# Patient Record
Sex: MALE | Race: WHITE | NOT HISPANIC OR LATINO | Employment: FULL TIME | URBAN - METROPOLITAN AREA
[De-identification: names, ages, dates, MRNs, and addresses within clinical notes are randomized per-mention and may not be internally consistent; named-entity substitution may affect disease eponyms.]

---

## 2024-03-28 ENCOUNTER — HOSPITAL ENCOUNTER (EMERGENCY)
Facility: HOSPITAL | Age: 29
Discharge: HOME | End: 2024-03-28
Attending: EMERGENCY MEDICINE
Payer: COMMERCIAL

## 2024-03-28 ENCOUNTER — APPOINTMENT (OUTPATIENT)
Dept: CT IMAGING | Facility: HOSPITAL | Age: 29
End: 2024-03-28
Payer: COMMERCIAL

## 2024-03-28 VITALS
WEIGHT: 315 LBS | HEART RATE: 99 BPM | DIASTOLIC BLOOD PRESSURE: 64 MMHG | SYSTOLIC BLOOD PRESSURE: 118 MMHG | TEMPERATURE: 98.1 F | BODY MASS INDEX: 41.75 KG/M2 | HEIGHT: 73 IN | RESPIRATION RATE: 17 BRPM | OXYGEN SATURATION: 96 %

## 2024-03-28 DIAGNOSIS — B34.9 VIRAL ILLNESS: ICD-10-CM

## 2024-03-28 DIAGNOSIS — R79.89 ELEVATED LFTS: ICD-10-CM

## 2024-03-28 DIAGNOSIS — K76.0 HEPATIC STEATOSIS: Primary | ICD-10-CM

## 2024-03-28 DIAGNOSIS — E87.6 HYPOKALEMIA: ICD-10-CM

## 2024-03-28 DIAGNOSIS — D72.829 LEUKOCYTOSIS, UNSPECIFIED TYPE: ICD-10-CM

## 2024-03-28 DIAGNOSIS — R30.0 DYSURIA: ICD-10-CM

## 2024-03-28 LAB
ALBUMIN SERPL-MCNC: 3.7 G/DL (ref 3.5–5.2)
ALBUMIN/GLOB SERPL: 1.2 G/DL
ALP SERPL-CCNC: 52 U/L (ref 39–117)
ALT SERPL W P-5'-P-CCNC: 64 U/L (ref 1–41)
AMORPH URATE CRY URNS QL MICRO: NORMAL /HPF
ANION GAP SERPL CALCULATED.3IONS-SCNC: 10.5 MMOL/L (ref 5–15)
ANISOCYTOSIS BLD QL: ABNORMAL
AST SERPL-CCNC: 87 U/L (ref 1–40)
BACTERIA UR QL AUTO: NORMAL /HPF
BASOPHILS # BLD MANUAL: 0.13 10*3/MM3 (ref 0–0.2)
BASOPHILS NFR BLD MANUAL: 1 % (ref 0–1.5)
BILIRUB SERPL-MCNC: 0.9 MG/DL (ref 0–1.2)
BILIRUB UR QL STRIP: ABNORMAL
BUN SERPL-MCNC: 8 MG/DL (ref 6–20)
BUN/CREAT SERPL: 7 (ref 7–25)
CALCIUM SPEC-SCNC: 8.7 MG/DL (ref 8.6–10.5)
CHLORIDE SERPL-SCNC: 96 MMOL/L (ref 98–107)
CLARITY UR: CLEAR
CO2 SERPL-SCNC: 26.5 MMOL/L (ref 22–29)
COLOR UR: ABNORMAL
CREAT SERPL-MCNC: 1.15 MG/DL (ref 0.76–1.27)
D-LACTATE SERPL-SCNC: 1.6 MMOL/L (ref 0.5–2)
DEPRECATED RDW RBC AUTO: 41.8 FL (ref 37–54)
EGFRCR SERPLBLD CKD-EPI 2021: 89.5 ML/MIN/1.73
ERYTHROCYTE [DISTWIDTH] IN BLOOD BY AUTOMATED COUNT: 14 % (ref 12.3–15.4)
GLOBULIN UR ELPH-MCNC: 3.2 GM/DL
GLUCOSE SERPL-MCNC: 110 MG/DL (ref 65–99)
GLUCOSE UR STRIP-MCNC: NEGATIVE MG/DL
HCT VFR BLD AUTO: 41.7 % (ref 37.5–51)
HETEROPH AB SER QL LA: NEGATIVE
HGB BLD-MCNC: 13.9 G/DL (ref 13–17.7)
HGB UR QL STRIP.AUTO: ABNORMAL
HOLD SPECIMEN: NORMAL
HYALINE CASTS UR QL AUTO: NORMAL /LPF
KETONES UR QL STRIP: NEGATIVE
LEUKOCYTE ESTERASE UR QL STRIP.AUTO: NEGATIVE
LIPASE SERPL-CCNC: 39 U/L (ref 13–60)
LYMPHOCYTES # BLD MANUAL: 6.45 10*3/MM3 (ref 0.7–3.1)
LYMPHOCYTES NFR BLD MANUAL: 13 % (ref 5–12)
MAGNESIUM SERPL-MCNC: 2 MG/DL (ref 1.6–2.6)
MCH RBC QN AUTO: 27.1 PG (ref 26.6–33)
MCHC RBC AUTO-ENTMCNC: 33.3 G/DL (ref 31.5–35.7)
MCV RBC AUTO: 81.4 FL (ref 79–97)
METAMYELOCYTES NFR BLD MANUAL: 1 % (ref 0–0)
MICROCYTES BLD QL: ABNORMAL
MONOCYTES # BLD: 1.68 10*3/MM3 (ref 0.1–0.9)
NEUTROPHILS # BLD AUTO: 4.51 10*3/MM3 (ref 1.7–7)
NEUTROPHILS NFR BLD MANUAL: 35 % (ref 42.7–76)
NITRITE UR QL STRIP: NEGATIVE
NRBC SPEC MANUAL: 1 /100 WBC (ref 0–0.2)
PH UR STRIP.AUTO: 6 [PH] (ref 5–8)
PLAT MORPH BLD: NORMAL
PLATELET # BLD AUTO: 163 10*3/MM3 (ref 140–450)
PMV BLD AUTO: 9.7 FL (ref 6–12)
POLYCHROMASIA BLD QL SMEAR: ABNORMAL
POTASSIUM SERPL-SCNC: 2.9 MMOL/L (ref 3.5–5.2)
PROT SERPL-MCNC: 6.9 G/DL (ref 6–8.5)
PROT UR QL STRIP: ABNORMAL
RBC # BLD AUTO: 5.12 10*6/MM3 (ref 4.14–5.8)
RBC # UR STRIP: NORMAL /HPF
REF LAB TEST METHOD: NORMAL
SMUDGE CELLS BLD QL SMEAR: ABNORMAL
SODIUM SERPL-SCNC: 133 MMOL/L (ref 136–145)
SP GR UR STRIP: 1.01 (ref 1–1.03)
SQUAMOUS #/AREA URNS HPF: NORMAL /HPF
UROBILINOGEN UR QL STRIP: ABNORMAL
VARIANT LYMPHS NFR BLD MANUAL: 50 % (ref 19.6–45.3)
WBC # UR STRIP: NORMAL /HPF
WBC NRBC COR # BLD AUTO: 12.82 10*3/MM3 (ref 3.4–10.8)

## 2024-03-28 PROCEDURE — 83735 ASSAY OF MAGNESIUM: CPT | Performed by: PHYSICIAN ASSISTANT

## 2024-03-28 PROCEDURE — 99284 EMERGENCY DEPT VISIT MOD MDM: CPT | Performed by: PHYSICIAN ASSISTANT

## 2024-03-28 PROCEDURE — 83690 ASSAY OF LIPASE: CPT | Performed by: PHYSICIAN ASSISTANT

## 2024-03-28 PROCEDURE — 86308 HETEROPHILE ANTIBODY SCREEN: CPT | Performed by: PHYSICIAN ASSISTANT

## 2024-03-28 PROCEDURE — 85007 BL SMEAR W/DIFF WBC COUNT: CPT | Performed by: PHYSICIAN ASSISTANT

## 2024-03-28 PROCEDURE — 85025 COMPLETE CBC W/AUTO DIFF WBC: CPT | Performed by: PHYSICIAN ASSISTANT

## 2024-03-28 PROCEDURE — 36415 COLL VENOUS BLD VENIPUNCTURE: CPT

## 2024-03-28 PROCEDURE — 80053 COMPREHEN METABOLIC PANEL: CPT | Performed by: PHYSICIAN ASSISTANT

## 2024-03-28 PROCEDURE — 99285 EMERGENCY DEPT VISIT HI MDM: CPT

## 2024-03-28 PROCEDURE — 25510000001 IOPAMIDOL PER 1 ML: Performed by: EMERGENCY MEDICINE

## 2024-03-28 PROCEDURE — 83605 ASSAY OF LACTIC ACID: CPT | Performed by: PHYSICIAN ASSISTANT

## 2024-03-28 PROCEDURE — 25810000003 SODIUM CHLORIDE 0.9 % SOLUTION: Performed by: PHYSICIAN ASSISTANT

## 2024-03-28 PROCEDURE — 81001 URINALYSIS AUTO W/SCOPE: CPT | Performed by: PHYSICIAN ASSISTANT

## 2024-03-28 PROCEDURE — 74177 CT ABD & PELVIS W/CONTRAST: CPT

## 2024-03-28 RX ORDER — ONDANSETRON 4 MG/1
4 TABLET, ORALLY DISINTEGRATING ORAL EVERY 6 HOURS PRN
Qty: 15 TABLET | Refills: 0 | Status: SHIPPED | OUTPATIENT
Start: 2024-03-28 | End: 2024-04-02

## 2024-03-28 RX ORDER — POTASSIUM CHLORIDE 20 MEQ/1
20 TABLET, EXTENDED RELEASE ORAL DAILY
Qty: 7 TABLET | Refills: 0 | Status: SHIPPED | OUTPATIENT
Start: 2024-03-28 | End: 2024-04-04

## 2024-03-28 RX ORDER — IBUPROFEN 200 MG
200 TABLET ORAL EVERY 6 HOURS PRN
COMMUNITY

## 2024-03-28 RX ORDER — POTASSIUM CHLORIDE 1.5 G/1.58G
40 POWDER, FOR SOLUTION ORAL ONCE
Status: COMPLETED | OUTPATIENT
Start: 2024-03-28 | End: 2024-03-28

## 2024-03-28 RX ORDER — CEPHALEXIN 500 MG/1
500 CAPSULE ORAL 3 TIMES DAILY
Qty: 21 CAPSULE | Refills: 0 | Status: SHIPPED | OUTPATIENT
Start: 2024-03-28 | End: 2024-04-04

## 2024-03-28 RX ORDER — DICYCLOMINE HCL 20 MG
20 TABLET ORAL EVERY 6 HOURS PRN
Qty: 15 TABLET | Refills: 0 | Status: SHIPPED | OUTPATIENT
Start: 2024-03-28 | End: 2024-04-02

## 2024-03-28 RX ADMIN — POTASSIUM CHLORIDE 40 MEQ: 1.5 POWDER, FOR SOLUTION ORAL at 12:50

## 2024-03-28 RX ADMIN — SODIUM CHLORIDE 1000 ML: 9 INJECTION, SOLUTION INTRAVENOUS at 11:40

## 2024-03-28 RX ADMIN — IOPAMIDOL 100 ML: 755 INJECTION, SOLUTION INTRAVENOUS at 12:09

## 2024-03-28 NOTE — DISCHARGE INSTRUCTIONS
As discussed, light activity for the next 2 to 3 days and drink at least 8 glasses of water a day to stay hydrated.  Take the medications as prescribed.  I advise that you call both numbers provided to establish a primary care doctor and to have a close follow-up with gastroenterology to recheck your symptoms and recheck your blood work.

## 2024-03-28 NOTE — Clinical Note
[facility]  [address]  [place] [address]    Yuval Izaguirre was seen and treated in our emergency department on 3/28/2024.  He may return to work on 04/01/2024.         Thank you for choosing [facility].    Arabella Mendez PA-C

## 2024-03-28 NOTE — Clinical Note
[facility]  [address]  [place] [address]    Yuval Izaguirre was seen and treated in our emergency department on 3/28/2024.  He may return to work on 04/01/2024.         Thank you for choosing [facility].    Arabella Mendez PA-C

## 2024-03-28 NOTE — FSED PROVIDER NOTE
Subjective   History of Present Illness    Patient is complaining of nausea, vomiting, chills, fever which started about 7 days ago.  She was seen at [facility] 3 days ago and was diagnosed with bilateral ear infection and was was prescribed a Z-Qamar and steroids, he developed waxing and waning right lower quadrant abdominal pain 2 days ago with associated dysuria.  Denies any abdominal surgeries in the past.  He reports that he has vomited approximately 3 times today.    In addition, he reports that he had an episode of diarrhea prior to arrival and it was watery with bright red blood.    Review of Systems   Constitutional:  Positive for chills and fever. Negative for activity change and appetite change.   Eyes:  Negative for pain.   Respiratory:  Negative for shortness of breath.    Gastrointestinal:  Positive for abdominal pain, diarrhea, nausea and vomiting.   Musculoskeletal:  Negative for arthralgias.   Skin:  Negative for color change.   Neurological:  Negative for dizziness.   All other systems reviewed and are negative.      History reviewed. No pertinent past medical history.    No Known Allergies    Past Surgical History:   Procedure Laterality Date    LEG TENDON REPAIR Left     SHOULDER ARTHROSCOPY Right        History reviewed. No pertinent family history.    Social History     Socioeconomic History    Marital status: Single   Tobacco Use    Smokeless tobacco: Current     Types: Chew   Substance and Sexual Activity    Alcohol use: Yes     Comment: socially           Objective   Physical Exam  Vitals and nursing note reviewed.   Constitutional:       Appearance: Normal appearance. He is normal weight.   HENT:      Head: Normocephalic and atraumatic.      Nose: Nose normal.      Mouth/Throat:      Mouth: Mucous membranes are dry.   Eyes:      Pupils: Pupils are equal, round, and reactive to light.   Cardiovascular:      Rate and Rhythm: Regular rhythm. Tachycardia present.      Pulses:  Normal pulses.      Heart sounds: Normal heart sounds.   Pulmonary:      Effort: Pulmonary effort is normal.      Breath sounds: Normal breath sounds.   Abdominal:      Palpations: Abdomen is soft.   Musculoskeletal:         General: Normal range of motion.      Cervical back: Normal range of motion.      Right lower leg: No edema.      Left lower leg: No edema.   Skin:     General: Skin is warm.   Neurological:      General: No focal deficit present.      Mental Status: He is alert.   Psychiatric:         Behavior: Behavior is cooperative.         Procedures           ED Course  ED Course as of 03/28/24 1350   Thu Mar 28, 2024   1132 Blood, UA(!): Trace [SS]   1134 I offered him pain medication and nausea medication, he is refusing at this time. [SS]   1137 WBC(!): 12.82 [SS]   1148 Potassium(!): 2.9 [SS]   1149 ALT (SGPT)(!): 64 [SS]   1149 AST (SGOT)(!): 87 [SS]   1249 I rechecked patient and he reports feeling somewhat better after the IV fluids, I again offered him pain medication or nausea medication here, he is refusing.  I discussed with him the CT abdomen results including the hepatics steatosis and abnormal lab results including the slightly elevated LFTs and low potassium.  I will refer him to GI.  I also discussed with him the importance of establishing a primary care doctor. [SS]      ED Course User Index  [SS] Arabella Mendez PA-C                                           Medical Decision Making  Problems Addressed:  Dysuria: complicated acute illness or injury  Elevated LFTs: complicated acute illness or injury  Hepatic steatosis: complicated acute illness or injury  Hypokalemia: complicated acute illness or injury  Leukocytosis, unspecified type: complicated acute illness or injury  Viral illness: complicated acute illness or injury    Amount and/or Complexity of Data Reviewed  Labs: ordered. Decision-making details documented in ED Course.  Radiology: ordered.    Risk  Prescription drug  management.        Final diagnoses:   Elevated LFTs   Hypokalemia   Leukocytosis, unspecified type   Hepatic steatosis   Dysuria   Viral illness       ED Disposition  ED Disposition       ED Disposition   Discharge    Condition   Stable    Comment   --               PATIENT CONNECTION - [place] [address]  [phone]  Call today      Julissa Montes MD  [address]  [place] [address]  [phone]    Call today  GASTROENTEROLOGY         Medication List        New Prescriptions      cephalexin 500 MG capsule  Commonly known as: KEFLEX  Take 1 capsule by mouth 3 (Three) Times a Day for 7 days.     dicyclomine 20 MG tablet  Commonly known as: BENTYL  Take 1 tablet by mouth Every 6 (Six) Hours As Needed for Abdominal Cramping for up to 5 days.     ondansetron ODT 4 MG disintegrating tablet  Commonly known as: ZOFRAN-ODT  Place 1 tablet on the tongue Every 6 (Six) Hours As Needed for Nausea for up to 5 days.     potassium chloride 20 MEQ CR tablet  Commonly known as: KLOR-CON M20  Take 1 tablet by mouth Daily for 7 days.               Where to Get Your Medications        These medications were sent to [facility] [id number] - [place], [place] - [address] AT [facility] - [phone]  - [phone] fx [address], [facility] [address]      Phone: [phone]   cephalexin 500 MG capsule  dicyclomine 20 MG tablet  ondansetron ODT 4 MG disintegrating tablet  potassium chloride 20 MEQ CR tablet

## 2024-10-24 ENCOUNTER — OFFICE VISIT (OUTPATIENT)
Dept: FAMILY MEDICINE CLINIC | Facility: CLINIC | Age: 29
End: 2024-10-24
Payer: COMMERCIAL

## 2024-10-24 VITALS
SYSTOLIC BLOOD PRESSURE: 126 MMHG | WEIGHT: 315 LBS | DIASTOLIC BLOOD PRESSURE: 84 MMHG | BODY MASS INDEX: 41.75 KG/M2 | HEIGHT: 73 IN | OXYGEN SATURATION: 97 % | HEART RATE: 80 BPM

## 2024-10-24 DIAGNOSIS — Z13.21 ENCOUNTER FOR VITAMIN DEFICIENCY SCREENING: ICD-10-CM

## 2024-10-24 DIAGNOSIS — Z87.828 HISTORY OF TEAR OF MENISCUS OF KNEE JOINT: ICD-10-CM

## 2024-10-24 DIAGNOSIS — R73.03 PREDIABETES: ICD-10-CM

## 2024-10-24 DIAGNOSIS — E66.01 MORBID (SEVERE) OBESITY DUE TO EXCESS CALORIES: ICD-10-CM

## 2024-10-24 DIAGNOSIS — K76.0 HEPATIC STEATOSIS: ICD-10-CM

## 2024-10-24 DIAGNOSIS — Z23 ENCOUNTER FOR ADMINISTRATION OF VACCINE: ICD-10-CM

## 2024-10-24 DIAGNOSIS — Z11.59 ENCOUNTER FOR HEPATITIS C SCREENING TEST FOR LOW RISK PATIENT: ICD-10-CM

## 2024-10-24 DIAGNOSIS — Z13.29 SCREENING FOR THYROID DISORDER: ICD-10-CM

## 2024-10-24 DIAGNOSIS — Z00.00 ANNUAL PHYSICAL EXAM: Primary | ICD-10-CM

## 2024-10-24 PROCEDURE — 99385 PREV VISIT NEW AGE 18-39: CPT | Performed by: NURSE PRACTITIONER

## 2024-10-24 PROCEDURE — 99214 OFFICE O/P EST MOD 30 MIN: CPT | Performed by: NURSE PRACTITIONER

## 2024-10-24 PROCEDURE — 90471 IMMUNIZATION ADMIN: CPT | Performed by: NURSE PRACTITIONER

## 2024-10-24 PROCEDURE — 90715 TDAP VACCINE 7 YRS/> IM: CPT | Performed by: NURSE PRACTITIONER

## 2024-10-24 RX ORDER — SEMAGLUTIDE 0.25 MG/.5ML
0.25 INJECTION, SOLUTION SUBCUTANEOUS WEEKLY
Qty: 2 ML | Refills: 2 | Status: SHIPPED | OUTPATIENT
Start: 2024-10-24

## 2024-10-24 NOTE — PROGRESS NOTES
Office Note     Name: Yuval Izaguirre    : 1995     MRN: [id number]     Chief Complaint  Establish Care (Pt is here to establish care, Hasn't seen a primary care in about 6 years ), Weight Loss (Pt states he is wanting help with weight loss ), and Knee Pain (Pt has right knee pain for 2 years now, Pt states it hurts when moving )    Subjective     History of Present Illness:  Yuval Izaguirre is a 29 y.o. male who presents today for establishing care and annual physical.     History of Present Illness  The patient is a 29-year-old male who presents for an annual physical.    He has been experiencing increasing knee pain and is seeking a primary care physician for a physical examination. His last medical consultation was in 2024 at [facility] in [place], where he was diagnosed with fatty liver disease. He has not had any gastrointestinal issues since then. He was referred to a gastroenterologist, but the appointment was cancelled due to the doctor's unavailability; patient chose not to reschedule the appointment due to 4-month wait-period for scheduling. He has not had any lab tests done since his ER visit. His blood sugar levels have always been within the normal range. About 10 years ago, he was prescribed a weight loss medication, potentially Phentermine, which caused constipation. This medication provided him with energy.    Despite maintaining a healthy diet and regular exercise, he has been unable to lose weight. He typically gets 6 to 8 hours of sleep and eats small meals.    He has been undergoing physical therapy for a partially torn meniscus in his right knee. Despite this, he continues to experience pain. He has also had nosebleeds, which he attributes to dry sinuses. He has not had any nose fractures and has previously consulted an ENT specialist. He has a history of childhood asthma and contracted COVID-19 in 2019. He has received two doses of the Moderna COVID-19 vaccine.    SOCIAL  "HISTORY  He has 3 children. He works at [employer]. He is a nonsmoker.    FAMILY HISTORY  His maternal grandfather had stroke and heart attack and passed away. His grandmother  from brain cancer. His grandfather  3 years ago from lung cancer and he also had prostate cancer. There is some diabetes throughout the family. His father has gout. His mother has mastocytosis. His mother's 2 sisters also have a form of it.      Objective     History reviewed. No pertinent past medical history.  Past Surgical History:   Procedure Laterality Date    LEG TENDON REPAIR Left     SHOULDER ARTHROSCOPY Right      Family History   Problem Relation Age of Onset    Mastocytosis Mother     Gout Father     Stroke Maternal Grandfather     Brain cancer Paternal Grandmother     Prostate cancer Paternal Grandfather     Lung cancer Paternal Grandfather        Vital Signs  /84 (BP Location: Left arm, Patient Position: Sitting, Cuff Size: Large Adult)   Pulse 80   Ht 185.4 cm (73\")   Wt (!) 175 kg (385 lb)   SpO2 97%   BMI 50.79 kg/m²   Estimated body mass index is 50.79 kg/m² as calculated from the following:    Height as of this encounter: 185.4 cm (73\").    Weight as of this encounter: 175 kg (385 lb).    Class 3 Severe Obesity (BMI >=40). Obesity-related health conditions include the following: none. Obesity is worsening. BMI is is above average; BMI management plan is completed. We discussed low calorie, low carb based diet program, portion control, increasing exercise, pharmacologic options including Wegovy/Zepbound and Phentermine, and Information on healthy weight added to patient's after visit summary.    Physical Exam  Vitals reviewed.   Constitutional:       Appearance: Normal appearance.   Cardiovascular:      Rate and Rhythm: Normal rate and regular rhythm.      Heart sounds: Normal heart sounds.   Pulmonary:      Effort: Pulmonary effort is normal.      Breath sounds: Normal breath sounds.   Skin:     General: " Skin is warm and dry.      Capillary Refill: Capillary refill takes less than 2 seconds.   Neurological:      General: No focal deficit present.      Mental Status: He is alert and oriented to person, place, and time.   Psychiatric:         Mood and Affect: Mood normal.         Behavior: Behavior normal.        Physical Exam      Results         Assessment and Plan     Diagnoses and all orders for this visit:    1. Annual physical exam (Primary)  -     Comprehensive Metabolic Panel; Future  -     CBC & Differential; Future  -     TSH; Future  -     Lipid Panel; Future  -     Hemoglobin A1c; Future  -     T4, Free; Future  -     Vitamin D,25-Hydroxy; Future  -     Vitamin B12; Future  -     Folate; Future  -     Tdap Vaccine => 6yo IM (BOOSTRIX/ADACEL)  -     Hepatitis C Antibody; Future    2. Hepatic steatosis  -     Lipid Panel; Future    3. Prediabetes  -     Comprehensive Metabolic Panel; Future  -     CBC & Differential; Future  -     Hemoglobin A1c; Future    4. Screening for thyroid disorder  -     TSH; Future  -     T4, Free; Future    5. Encounter for vitamin deficiency screening  -     Vitamin D,25-Hydroxy; Future  -     Vitamin B12; Future  -     Folate; Future    6. Encounter for hepatitis C screening test for low risk patient  -     Hepatitis C Antibody; Future    7. History of tear of meniscus of knee joint    8. Encounter for administration of vaccine  -     Tdap Vaccine => 6yo IM (BOOSTRIX/ADACEL)    9. Morbid (severe) obesity due to excess calories  -     Semaglutide-Weight Management (Wegovy) 0.25 MG/0.5ML solution auto-injector; Inject 0.5 mL under the skin into the appropriate area as directed 1 (One) Time Per Week.  Dispense: 2 mL; Refill: 2      Assessment & Plan  1. Annual Physical.  Labs will be ordered for blood sugar and cholesterol levels. He should schedule a lab-only appointment on a morning when fasting (nothing but water or black coffee). A prescription for Wegovy will be sent to  [facility], pending insurance approval. If insurance does not cover Wegovy, phentermine will be considered as an alternative. The Tdap vaccine will be administered today.    2. Nosebleeds.  Afrin nasal spray will be used as needed to stop nosebleeds. If nosebleeds persist, a referral to an ENT specialist will be considered. He is not interested in seeking ENT evaluation at this time.    3. Weight Management.  Weight loss medications such as Wegovy and Zepbound were discussed. If insurance does not cover Wegovy, phentermine will be considered. He was informed about the potential side effects of these medications, including nausea, diarrhea, and constipation. He was advised to eat healthy foods to minimize side effects.    4. Partially Torn Meniscus.  He will continue with physical therapy exercises at home. He prefers to try weight loss first before considering a referral to an orthopedic specialist.        Follow Up  Return in about 1 year (around 10/24/2025) for Annual physical.      Patient or patient representative verbalized consent for the use of Ambient Listening during the visit with  BLAS Ramirez for chart documentation. 10/26/2024  09:49 EDT    BLAS Ramirez

## 2024-10-25 ENCOUNTER — LAB (OUTPATIENT)
Dept: FAMILY MEDICINE CLINIC | Facility: CLINIC | Age: 29
End: 2024-10-25
Payer: COMMERCIAL

## 2024-10-27 DIAGNOSIS — E55.9 VITAMIN D DEFICIENCY: Primary | ICD-10-CM

## 2024-10-27 RX ORDER — MULTIVIT-MIN/IRON/FOLIC ACID/K 18-600-40
2000 CAPSULE ORAL DAILY
Qty: 90 CAPSULE | Refills: 1 | Status: SHIPPED | OUTPATIENT
Start: 2024-10-27

## 2024-10-30 ENCOUNTER — TELEPHONE (OUTPATIENT)
Dept: FAMILY MEDICINE CLINIC | Facility: CLINIC | Age: 29
End: 2024-10-30
Payer: COMMERCIAL

## 2024-10-30 NOTE — TELEPHONE ENCOUNTER
Pa for Wegovy had gotten denied     Paper reads     This request was denied because you did not meet the following requirements:   The requested medication and/or diagnosis are not a covered benefit and excluded from coverage  in accordance with the terms and conditions of your plan benefit.  The requested drug is not covered by the plan. Please contact member services for further  assistance.

## 2025-04-15 ENCOUNTER — OFFICE VISIT (OUTPATIENT)
Dept: FAMILY MEDICINE CLINIC | Facility: CLINIC | Age: 30
End: 2025-04-15
Payer: COMMERCIAL

## 2025-04-15 VITALS
HEART RATE: 83 BPM | SYSTOLIC BLOOD PRESSURE: 124 MMHG | WEIGHT: 315 LBS | OXYGEN SATURATION: 97 % | DIASTOLIC BLOOD PRESSURE: 82 MMHG | TEMPERATURE: 98.7 F | BODY MASS INDEX: 41.75 KG/M2 | HEIGHT: 73 IN

## 2025-04-15 DIAGNOSIS — R05.8 PRODUCTIVE COUGH: ICD-10-CM

## 2025-04-15 DIAGNOSIS — E88.819 INSULIN RESISTANCE: ICD-10-CM

## 2025-04-15 DIAGNOSIS — E66.01 MORBID (SEVERE) OBESITY DUE TO EXCESS CALORIES: ICD-10-CM

## 2025-04-15 DIAGNOSIS — J01.10 ACUTE NON-RECURRENT FRONTAL SINUSITIS: Primary | ICD-10-CM

## 2025-04-15 DIAGNOSIS — H11.33 SCLERAL HEMORRHAGE OF BOTH EYES: ICD-10-CM

## 2025-04-15 DIAGNOSIS — J34.89 SINUS PRESSURE: ICD-10-CM

## 2025-04-15 DIAGNOSIS — H66.001 NON-RECURRENT ACUTE SUPPURATIVE OTITIS MEDIA OF RIGHT EAR WITHOUT SPONTANEOUS RUPTURE OF TYMPANIC MEMBRANE: ICD-10-CM

## 2025-04-15 DIAGNOSIS — R09.81 NASAL CONGESTION: ICD-10-CM

## 2025-04-15 DIAGNOSIS — R11.10 VOMITING IN ADULT: ICD-10-CM

## 2025-04-15 DIAGNOSIS — J30.89 ENVIRONMENTAL AND SEASONAL ALLERGIES: ICD-10-CM

## 2025-04-15 RX ORDER — GUAIFENESIN 600 MG/1
TABLET, EXTENDED RELEASE ORAL
COMMUNITY
Start: 2025-01-26

## 2025-04-15 RX ORDER — METFORMIN HYDROCHLORIDE 500 MG/1
500 TABLET, EXTENDED RELEASE ORAL
Qty: 90 TABLET | Refills: 1 | Status: SHIPPED | OUTPATIENT
Start: 2025-04-15

## 2025-04-15 RX ORDER — CETIRIZINE HYDROCHLORIDE 10 MG/1
10 TABLET ORAL DAILY
COMMUNITY

## 2025-04-15 RX ORDER — CEFDINIR 300 MG/1
300 CAPSULE ORAL 2 TIMES DAILY
Qty: 20 CAPSULE | Refills: 0 | Status: SHIPPED | OUTPATIENT
Start: 2025-04-15

## 2025-04-15 RX ORDER — DEXTROMETHORPHAN HYDROBROMIDE AND PROMETHAZINE HYDROCHLORIDE 15; 6.25 MG/5ML; MG/5ML
5 SYRUP ORAL 4 TIMES DAILY PRN
Qty: 240 ML | Refills: 0 | Status: SHIPPED | OUTPATIENT
Start: 2025-04-15

## 2025-04-15 NOTE — PROGRESS NOTES
Office Note     Name: Yuval Izaguirre    : 1995     MRN: [id number]     Chief Complaint  Nasal Congestion (Pt has been having drainage ), Eye Pain (Pt has pressure behind eyes), Cough (Pt has been having drainage with a cough for 4 days now. Causes him to vomit at times. Pt states he gets this every couple of weeks ), Vomiting (Pt tends to vomit ), Fatigue (Pt has been very tired lately), and Insomnia (Pt has been having a hard time sleeping lately)    Subjective     History of Present Illness:  Yuval Izaguirre is a 30 y.o. male who presents today for illness.     History of Present Illness  The patient presents for evaluation of severe congestion, insomnia, and concerns about insulin resistance.    He reports experiencing severe congestion, which has recently begun to alleviate. The congestion is accompanied by coughing fits that are so intense they induce vomiting. He also notes the presence of a scleral hemorrhage in both eyes, causing discomfort. Symptoms are exacerbated when outdoors but improve slightly indoors. The onset of these symptoms was approximately 2 weeks ago, following his return from his brother's . He has been unable to sleep due to the severity of his symptoms, including choking when lying on his back and hourly awakenings due to coughing. No fever has been noted. Initially, symptoms were attributed to allergies, but they have persisted beyond the typical duration. The cough occasionally produces sputum, but more often it is lodged in the back of the throat. An itchy throat on Saturday and  nights triggered additional coughing and subsequent vomiting, though vomiting has not occurred since  night.     He has not taken any antibiotics recently and reports no known allergies to antibiotics. Self-medication with Mucinex has provided some relief, and Bromfed initially helped with the cough but has since lost its efficacy. A previous episode of a single eye scleral  "hemorrhage was recalled, but bilateral occurrence is new. A dull earache is reported, attributed to allergies. A similar ear infection occurred at the end of 02/2025, for which amoxicillin was prescribed.    Interest in trying metformin for insulin resistance is expressed, given the family history of insulin resistance and observed benefits in his father.    Assistance with sleep issues is requested, as he is currently only able to sleep from 3:00 AM to 7:00 AM.    FAMILY HISTORY  His father and sister have insulin resistance and are on metformin.      Objective     History reviewed. No pertinent past medical history.  Past Surgical History:   Procedure Laterality Date    LEG TENDON REPAIR Left     SHOULDER ARTHROSCOPY Right      Family History   Problem Relation Age of Onset    Mastocytosis Mother     Gout Father     Stroke Maternal Grandfather     Brain cancer Paternal Grandmother     Prostate cancer Paternal Grandfather     Lung cancer Paternal Grandfather        Vital Signs  /82 (BP Location: Left arm, Patient Position: Sitting)   Pulse 83   Temp 98.7 °F (37.1 °C) (Oral)   Ht 185.4 cm (73\")   Wt (!) 178 kg (392 lb)   SpO2 97%   BMI 51.72 kg/m²   Estimated body mass index is 51.72 kg/m² as calculated from the following:    Height as of this encounter: 185.4 cm (73\").    Weight as of this encounter: 178 kg (392 lb).    Physical Exam  Vitals reviewed.   Constitutional:       Appearance: Normal appearance.   HENT:      Head: Normocephalic.      Right Ear: Ear canal and external ear normal. Tympanic membrane is erythematous and bulging.      Left Ear: Tympanic membrane, ear canal and external ear normal.      Nose: Congestion present.      Right Sinus: Frontal sinus tenderness present. No maxillary sinus tenderness.      Left Sinus: Frontal sinus tenderness present. No maxillary sinus tenderness.      Mouth/Throat:      Mouth: Mucous membranes are moist.      Pharynx: Posterior oropharyngeal erythema " present.   Eyes:      Conjunctiva/sclera:      Right eye: Hemorrhage present.      Left eye: Hemorrhage present.   Cardiovascular:      Rate and Rhythm: Normal rate and regular rhythm.      Heart sounds: Normal heart sounds. No murmur heard.  Pulmonary:      Effort: Pulmonary effort is normal. No respiratory distress.      Breath sounds: Normal breath sounds. No stridor. No wheezing, rhonchi or rales.   Skin:     General: Skin is warm and dry.   Neurological:      General: No focal deficit present.      Mental Status: He is alert and oriented to person, place, and time.   Psychiatric:         Mood and Affect: Mood normal.         Behavior: Behavior normal.       Physical Exam  Ears: Right ear infection, erythematous  Mouth/Throat: Erythematous throat with drainage  Respiratory: Clear to auscultation, no wheezing, rales or rhonchi  Cardiovascular: Regular rate and rhythm, no murmurs, rubs, or gallops  Other: Frontal sinusitis    Results  Labs   - Hemoglobin A1c: 10/2024, 5.6       Assessment and Plan     Diagnoses and all orders for this visit:    1. Acute non-recurrent frontal sinusitis (Primary)  -     cefdinir (OMNICEF) 300 MG capsule; Take 1 capsule by mouth 2 (Two) Times a Day.  Dispense: 20 capsule; Refill: 0    2. Non-recurrent acute suppurative otitis media of right ear without spontaneous rupture of tympanic membrane  -     cefdinir (OMNICEF) 300 MG capsule; Take 1 capsule by mouth 2 (Two) Times a Day.  Dispense: 20 capsule; Refill: 0    3. Environmental and seasonal allergies    4. Productive cough  -     promethazine-dextromethorphan (PROMETHAZINE-DM) 6.25-15 MG/5ML syrup; Take 5 mL by mouth 4 (Four) Times a Day As Needed for Cough.  Dispense: 240 mL; Refill: 0    5. Nasal congestion    6. Vomiting in adult    7. Scleral hemorrhage of both eyes    8. Sinus pressure    9. Morbid (severe) obesity due to excess calories    10. Insulin resistance  -     metFORMIN ER (GLUCOPHAGE-XR) 500 MG 24 hr tablet; Take 1  tablet by mouth Daily With Breakfast.  Dispense: 90 tablet; Refill: 1      Assessment & Plan  1. Right otitis media.  - Presents with a right ear infection, characterized by redness and inflammation.  - Physical examination reveals significant redness in the right ear.  - Discussed the condition and its likely exacerbation by allergies.  - Cefdinir 300 mg will be prescribed to be taken twice daily for 10 days. The prescription will be sent to [email or web address].    2. Frontal sinusitis.  - Exhibits symptoms consistent with frontal sinusitis, including nasal drainage and facial pain.  - Physical examination shows a red throat and drainage, with tenderness in the frontal sinus area.  - Discussed the condition and its potential trigger by allergies, noting that allergy medications alone are insufficient at this point.  - Cefdinir 300 mg will be prescribed to be taken twice daily for 10 days. The prescription will be sent to [email or web address].    3. Insomnia.  - Reports significant sleep disturbances, likely related to current illness and recent bereavement.  - No fever noted; lungs are clear upon examination.  - Discussed the impact of illness and emotional stress on sleep, and the plan to address the cough first.  - Promethazine DM will be prescribed to manage the cough and aid sleep. It can be taken up to four times daily but is likely to induce drowsiness, so it may be best taken at bedtime. Further treatment options will be considered if sleep issues persist post-illness.    4. Insulin resistance.  - Hemoglobin A1c level is at the upper limit of the normal range (5.6), indicating he is nearing prediabetes designation.  - Reviewed the patient's family history and discussed the benefits of metformin for insulin resistance.  - Discussed the mechanism of action of metformin and potential side effects, including gastrointestinal upset.  - Extended-release metformin will be prescribed to be taken once daily. A three-month supply will be  provided, and his response to the medication will be monitored.      Follow Up  Return if symptoms worsen or fail to improve.      Patient or patient representative verbalized consent for the use of Ambient Listening during the visit with  BLAS Ramirez for chart documentation. 4/20/2025  14:52 EDT    BLAS Ramirez